# Patient Record
Sex: MALE | Race: WHITE | Employment: STUDENT | URBAN - METROPOLITAN AREA
[De-identification: names, ages, dates, MRNs, and addresses within clinical notes are randomized per-mention and may not be internally consistent; named-entity substitution may affect disease eponyms.]

---
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
## 2023-02-27 ENCOUNTER — PATIENT OUTREACH (OUTPATIENT)
Dept: CASE MANAGEMENT | Age: 23
End: 2023-02-27

## 2023-02-27 NOTE — PROCEDURES
The office order for PCP request is Approved and finalized on February 27, 2023.     Thanks,  [facility] [employer]

## (undated) NOTE — LETTER
[facility] [employer] Child Health Examination       Student's Name  Mac Coad Birth Robinson Signature                                                                                                                                              Title                           Date    (If adding dates to the above immunization history section, put y ALLERGIES  (Food, drug, insect, other)  NONW MEDICATION  (List all prescribed or taken on a regular basis.) CONCERTA     Diagnosis of asthma?   Child wakes during the night coughing   Yes   No    Yes   No    Loss of function of one of paired organs? (eye/ea DIABETES SCREENING  BMI>85% age/sex  Yes And any two of the following:  Family History No   Ethnic Minority  No          Signs of Insulin Resistance (hypertension, dyslipidemia, polycystic ovarian syndrome, acanthosis nigricans)    No           At Risk  No Quick-relief  medication (e.g. Short Acting Beta Antagonist): No          Controller medication (e.g. inhaled corticosteroid):   No Other   NEEDS/MODIFICATIONS required in the school setting  None DIETARY Needs/Restrictions     None   SPECIAL INSTR

## (undated) NOTE — ED AVS SNAPSHOT
THE [facility] [place] [address] [address]    Phone:  [phone]    Fax:  [address]   MRN: [id number]    Department:  THE [facility] in [place]   Date of Visit:  4/22/2017           Diagn [address]  [phone] [address] [place] [address], [facility] [address]   [phone]       To Check ER Wait Times:  TEXT '[facility]' to [id number]      Click [email or web address]      Or call [phone]    If you have phone number before you leave. After you leave, you should follow the attached instructions. I have read and understand the instructions given to me by my caregivers.         [facility]        Pharmacy Address Phone Number   [phone]

## (undated) NOTE — Clinical Note
4/26/2017     To Whom it may Concern:    Mia Dunbar was seen in office 4/26/2017. Please excuse him from school 4/26/2017 and possibly 4/27/17 due to illness.       Rachel Alexander MD